# Patient Record
Sex: FEMALE | URBAN - METROPOLITAN AREA
[De-identification: names, ages, dates, MRNs, and addresses within clinical notes are randomized per-mention and may not be internally consistent; named-entity substitution may affect disease eponyms.]

---

## 2022-07-26 ENCOUNTER — OFFICE VISIT (OUTPATIENT)
Dept: URBAN - METROPOLITAN AREA CLINIC 6 | Facility: CLINIC | Age: 72
End: 2022-07-26
Payer: MEDICARE

## 2022-07-26 DIAGNOSIS — H02.421 MYOGENIC PTOSIS OF RIGHT EYELID: Primary | ICD-10-CM

## 2022-07-26 DIAGNOSIS — H53.40 VISUAL FIELD DEFECT: ICD-10-CM

## 2022-07-26 PROCEDURE — 92285 EXTERNAL OCULAR PHOTOGRAPHY: CPT | Performed by: OPHTHALMOLOGY

## 2022-07-26 PROCEDURE — 92012 INTRM OPH EXAM EST PATIENT: CPT | Performed by: OPHTHALMOLOGY

## 2022-07-26 ASSESSMENT — INTRAOCULAR PRESSURE
OD: 12
OS: 15

## 2022-07-26 NOTE — IMPRESSION/PLAN
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 Plan: Patient examined. Chart review. Patient has signs and symptoms and findings consistent with Myogenic Ptosis. Pt gets Botox injections. Explained Botox can affect measurements. This was diagrammatically explained to the patient. Patient voiced understanding. Discussed known options for management (do nothing, conservative management, and surgical intervention.)  Patient elects surgical intervention at this time. Benefits and risks discussed with emphasis on dual nature of etiology as well as management surgically of Myogenic ptosis. Return for evaluation when Botox effect is gone and for planned surgical intervention after Counseling and obtaining Informed Consent. Will determine how to proceed at next visit. External photos taken today. CPT code: 44183- Repair of blepharoptosis; (tarso) levator resection or advancement, external approach. 
57737- repair of brow ptosis (supraciliary, mid-forehead or coronal approach)

## 2022-07-26 NOTE — IMPRESSION/PLAN
Impression: Visual field defect: H53.40. Plan: Pt reports having difficulty seeing due to ptosis on the Right eye.

## 2022-10-18 ENCOUNTER — OFFICE VISIT (OUTPATIENT)
Dept: URBAN - METROPOLITAN AREA CLINIC 6 | Facility: CLINIC | Age: 72
End: 2022-10-18
Payer: MEDICARE

## 2022-10-18 DIAGNOSIS — H53.40 VISUAL FIELD DEFECT: Primary | ICD-10-CM

## 2022-10-18 DIAGNOSIS — H57.813 BROW PTOSIS, BILATERAL: ICD-10-CM

## 2022-10-18 PROCEDURE — 92081 LIMITED VISUAL FIELD XM: CPT | Performed by: OPHTHALMOLOGY

## 2022-10-18 PROCEDURE — 92012 INTRM OPH EXAM EST PATIENT: CPT | Performed by: OPHTHALMOLOGY

## 2022-10-18 ASSESSMENT — INTRAOCULAR PRESSURE
OD: 10
OS: 11

## 2022-10-18 NOTE — IMPRESSION/PLAN
Impression: Visual field defect: H53.40. Plan: Patient subjectively describes peripheral field defect. VF ordered & reviewed. Formal visual field testing does not reflect the clinical concern at this time due to chronic frontalis action, pt made effort to see during Vf test with lifting lids and eyebrows. OD w/o tape ptosis at 35 degrees with no improvement with tape. OS no ptosis effect with and without tape.

## 2022-10-18 NOTE — IMPRESSION/PLAN
Impression: Brow ptosis, bilateral: H57.813. Plan: Patient examined. Chart Reviewed.  Plan no intervention at this time till after repeat visual field ptosis